# Patient Record
Sex: FEMALE | Race: WHITE | NOT HISPANIC OR LATINO | URBAN - METROPOLITAN AREA
[De-identification: names, ages, dates, MRNs, and addresses within clinical notes are randomized per-mention and may not be internally consistent; named-entity substitution may affect disease eponyms.]

---

## 2022-04-25 ENCOUNTER — OFFICE VISIT (OUTPATIENT)
Dept: URBAN - METROPOLITAN AREA CLINIC 54 | Facility: CLINIC | Age: 47
End: 2022-04-25

## 2022-05-11 ENCOUNTER — OFFICE VISIT (OUTPATIENT)
Dept: URBAN - METROPOLITAN AREA CLINIC 54 | Facility: CLINIC | Age: 47
End: 2022-05-11
Payer: SELF-PAY

## 2022-05-11 VITALS
BODY MASS INDEX: 29.81 KG/M2 | DIASTOLIC BLOOD PRESSURE: 96 MMHG | HEART RATE: 78 BPM | WEIGHT: 162 LBS | TEMPERATURE: 97.3 F | SYSTOLIC BLOOD PRESSURE: 140 MMHG | HEIGHT: 62 IN

## 2022-05-11 DIAGNOSIS — Z12.11 COLON CANCER SCREENING: ICD-10-CM

## 2022-05-11 PROCEDURE — 99203 OFFICE O/P NEW LOW 30 MIN: CPT | Performed by: STUDENT IN AN ORGANIZED HEALTH CARE EDUCATION/TRAINING PROGRAM

## 2022-05-11 RX ORDER — SODIUM PICOSULFATE, MAGNESIUM OXIDE, AND ANHYDROUS CITRIC ACID 10; 3.5; 12 MG/160ML; G/160ML; G/160ML
160 ML LIQUID ORAL
Qty: 320 MILLILITER | Refills: 0 | OUTPATIENT
Start: 2022-05-11 | End: 2022-05-12

## 2022-05-11 RX ORDER — OMEPRAZOLE 20 MG/1
1 CAPSULE 30 MINUTES BEFORE MORNING MEAL CAPSULE, DELAYED RELEASE ORAL ONCE A DAY
Status: ACTIVE | COMMUNITY

## 2022-05-11 RX ORDER — CHOLECALCIFEROL (VITAMIN D3) 50 MCG
1 TABLET TABLET ORAL ONCE A DAY
Status: ACTIVE | COMMUNITY

## 2022-05-11 NOTE — EXAM-PHYSICAL EXAM
General: Well appearing. No acute distress. HEENT: Anicteric sclerae Cardiovascular: Normal heart rate Respiratory: Breathing comfortably without conversational dyspnea Abdomen:  non-distended, soft, non-tender, +surical scar Rectal: Deferred Skin: without visible rashes on examined areas. Musculoskeletal: ambulated without difficulty. Can stand from sitting position without assistance. Neuro: No gross focal deficits. Alert and oriented. Psych: Appropriate mood and affect.

## 2022-05-11 NOTE — HPI-TODAY'S VISIT:
46 F with a history cholecystectomy, GERD, and elevated BP presenting for colon screening.  No complaints today.   Patient denies change in bowel habits, diarrhea, constipation, bloody stools, melena, abdominal pain, unintentional weight loss,, family history of colorectal cancer.   Patient denies being on anticoagulants/antiplatelets, having a pacemaker/defibrillator, cardiac arrythmia or heart failure, recent (<3 months) MI, CVA, TIA, CAD/stents, MI, cardiac valvulopathy, history of anesthesia complications including high fever with anesthesia, hemo or peritoneal dialysis, seizure d/o, need for continuous home O2, severe pulmonary disease, need for assistance with walking or transferring from surface-to-surface.

## 2022-06-09 ENCOUNTER — DASHBOARD ENCOUNTERS (OUTPATIENT)
Age: 47
End: 2022-06-09

## 2022-06-13 ENCOUNTER — OFFICE VISIT (OUTPATIENT)
Dept: URBAN - METROPOLITAN AREA SURGERY CENTER 14 | Facility: SURGERY CENTER | Age: 47
End: 2022-06-13
Payer: SELF-PAY

## 2022-06-13 DIAGNOSIS — Z12.11 COLON CANCER SCREENING: ICD-10-CM

## 2022-06-13 PROCEDURE — G0121 COLON CA SCRN NOT HI RSK IND: HCPCS | Performed by: STUDENT IN AN ORGANIZED HEALTH CARE EDUCATION/TRAINING PROGRAM

## 2022-06-13 PROCEDURE — PINCL ALL INCLUSIVE: Performed by: STUDENT IN AN ORGANIZED HEALTH CARE EDUCATION/TRAINING PROGRAM

## 2022-06-13 PROCEDURE — G8907 PT DOC NO EVENTS ON DISCHARG: HCPCS | Performed by: STUDENT IN AN ORGANIZED HEALTH CARE EDUCATION/TRAINING PROGRAM

## 2022-06-13 RX ORDER — CHOLECALCIFEROL (VITAMIN D3) 50 MCG
1 TABLET TABLET ORAL ONCE A DAY
Status: ACTIVE | COMMUNITY

## 2022-06-13 RX ORDER — OMEPRAZOLE 20 MG/1
1 CAPSULE 30 MINUTES BEFORE MORNING MEAL CAPSULE, DELAYED RELEASE ORAL ONCE A DAY
Status: ACTIVE | COMMUNITY

## 2022-07-06 ENCOUNTER — OFFICE VISIT (OUTPATIENT)
Dept: URBAN - METROPOLITAN AREA CLINIC 54 | Facility: CLINIC | Age: 47
End: 2022-07-06

## 2022-07-06 RX ORDER — CHOLECALCIFEROL (VITAMIN D3) 50 MCG
1 TABLET TABLET ORAL ONCE A DAY
COMMUNITY

## 2022-07-06 RX ORDER — OMEPRAZOLE 20 MG/1
1 CAPSULE 30 MINUTES BEFORE MORNING MEAL CAPSULE, DELAYED RELEASE ORAL ONCE A DAY
COMMUNITY

## 2022-07-13 ENCOUNTER — TELEPHONE ENCOUNTER (OUTPATIENT)
Dept: URBAN - METROPOLITAN AREA CLINIC 54 | Facility: CLINIC | Age: 47
End: 2022-07-13